# Patient Record
Sex: MALE | URBAN - METROPOLITAN AREA
[De-identification: names, ages, dates, MRNs, and addresses within clinical notes are randomized per-mention and may not be internally consistent; named-entity substitution may affect disease eponyms.]

---

## 2024-08-02 ENCOUNTER — APPOINTMENT (RX ONLY)
Dept: URBAN - METROPOLITAN AREA CLINIC 68 | Facility: CLINIC | Age: 24
Setting detail: DERMATOLOGY
End: 2024-08-02

## 2024-08-02 DIAGNOSIS — D485 NEOPLASM OF UNCERTAIN BEHAVIOR OF SKIN: ICD-10-CM

## 2024-08-02 PROBLEM — D48.5 NEOPLASM OF UNCERTAIN BEHAVIOR OF SKIN: Status: ACTIVE | Noted: 2024-08-02

## 2024-08-02 PROCEDURE — ? COUNSELING

## 2024-08-02 PROCEDURE — ? ADDITIONAL NOTES

## 2024-08-02 PROCEDURE — 99202 OFFICE O/P NEW SF 15 MIN: CPT

## 2024-08-02 ASSESSMENT — LOCATION ZONE DERM
LOCATION ZONE: SCALP
LOCATION ZONE: NECK

## 2024-08-02 ASSESSMENT — LOCATION SIMPLE DESCRIPTION DERM
LOCATION SIMPLE: POSTERIOR SCALP
LOCATION SIMPLE: NECK

## 2024-08-02 ASSESSMENT — LOCATION DETAILED DESCRIPTION DERM
LOCATION DETAILED: RIGHT OCCIPITAL SCALP
LOCATION DETAILED: RIGHT CENTRAL LATERAL NECK
LOCATION DETAILED: LEFT INFERIOR OCCIPITAL SCALP

## 2024-08-02 NOTE — PROCEDURE: ADDITIONAL NOTES
Detail Level: Simple
Additional Notes: Lesion is hard and bony \\nPatient has several deep nodules, advised patient to be seen by PCP to get imaging done, he Is an existing patient with Dr. Abad, advised to make appointment with  as well
Render Risk Assessment In Note?: no
Additional Notes: Small lump bony \\nPatient has several deep nodules, advised patient to be seen by PCP to get imaging done, he Is an existing patient with Dr. Abad, advised to make appointment with  as well
Additional Notes: Deep nodule/cyst\\nPatient has several deep nodules, advised patient to be seen by PCP to get imaging done, he Is an existing patient with Dr. Abad, advised to make appointment with  as well